# Patient Record
Sex: FEMALE | Race: WHITE | Employment: FULL TIME | URBAN - METROPOLITAN AREA
[De-identification: names, ages, dates, MRNs, and addresses within clinical notes are randomized per-mention and may not be internally consistent; named-entity substitution may affect disease eponyms.]

---

## 2022-06-30 ENCOUNTER — APPOINTMENT (OUTPATIENT)
Dept: GENERAL RADIOLOGY | Age: 47
End: 2022-06-30
Payer: COMMERCIAL

## 2022-06-30 ENCOUNTER — HOSPITAL ENCOUNTER (EMERGENCY)
Age: 47
Discharge: HOME | End: 2022-06-30
Attending: EMERGENCY MEDICINE
Payer: COMMERCIAL

## 2022-06-30 VITALS
WEIGHT: 214 LBS | OXYGEN SATURATION: 95 % | TEMPERATURE: 98.5 F | HEIGHT: 67 IN | SYSTOLIC BLOOD PRESSURE: 140 MMHG | BODY MASS INDEX: 33.59 KG/M2 | RESPIRATION RATE: 16 BRPM | DIASTOLIC BLOOD PRESSURE: 86 MMHG | HEART RATE: 74 BPM

## 2022-06-30 DIAGNOSIS — S82.832A OTHER CLOSED FRACTURE OF DISTAL END OF LEFT FIBULA, INITIAL ENCOUNTER: Primary | ICD-10-CM

## 2022-06-30 PROCEDURE — 73610 X-RAY EXAM OF ANKLE: CPT

## 2022-06-30 PROCEDURE — 99283 EMERGENCY DEPT VISIT LOW MDM: CPT

## 2022-06-30 PROCEDURE — 73630 X-RAY EXAM OF FOOT: CPT

## 2022-06-30 PROCEDURE — 6370000000 HC RX 637 (ALT 250 FOR IP): Performed by: STUDENT IN AN ORGANIZED HEALTH CARE EDUCATION/TRAINING PROGRAM

## 2022-06-30 PROCEDURE — 73590 X-RAY EXAM OF LOWER LEG: CPT

## 2022-06-30 RX ORDER — ACETAMINOPHEN 325 MG/1
650 TABLET ORAL ONCE
Status: COMPLETED | OUTPATIENT
Start: 2022-06-30 | End: 2022-06-30

## 2022-06-30 RX ORDER — IBUPROFEN 400 MG/1
800 TABLET ORAL ONCE
Status: COMPLETED | OUTPATIENT
Start: 2022-06-30 | End: 2022-06-30

## 2022-06-30 RX ADMIN — IBUPROFEN 800 MG: 400 TABLET, FILM COATED ORAL at 13:38

## 2022-06-30 RX ADMIN — ACETAMINOPHEN 650 MG: 325 TABLET ORAL at 13:38

## 2022-06-30 ASSESSMENT — ENCOUNTER SYMPTOMS
COUGH: 0
BACK PAIN: 0
DIARRHEA: 0
NAUSEA: 0
VOMITING: 0
SHORTNESS OF BREATH: 0
SORE THROAT: 0
CHEST TIGHTNESS: 0
ABDOMINAL PAIN: 0

## 2022-06-30 ASSESSMENT — PAIN DESCRIPTION - ORIENTATION
ORIENTATION: LEFT
ORIENTATION: LEFT

## 2022-06-30 ASSESSMENT — PAIN SCALES - GENERAL
PAINLEVEL_OUTOF10: 0
PAINLEVEL_OUTOF10: 4
PAINLEVEL_OUTOF10: 7

## 2022-06-30 ASSESSMENT — PAIN DESCRIPTION - PAIN TYPE: TYPE: ACUTE PAIN

## 2022-06-30 ASSESSMENT — PAIN DESCRIPTION - ONSET: ONSET: SUDDEN

## 2022-06-30 ASSESSMENT — PAIN DESCRIPTION - DESCRIPTORS: DESCRIPTORS: SHARP

## 2022-06-30 ASSESSMENT — PAIN DESCRIPTION - LOCATION
LOCATION: ANKLE
LOCATION: ANKLE

## 2022-06-30 ASSESSMENT — PAIN - FUNCTIONAL ASSESSMENT: PAIN_FUNCTIONAL_ASSESSMENT: 0-10

## 2022-06-30 NOTE — ED PROVIDER NOTES
ED Attending Attestation Note     Date of evaluation: 6/30/2022    This patient was seen by the resident. I have seen and examined the patient, agree with the workup, evaluation, management and diagnosis. The care plan has been discussed. My assessment reveals a 47-year-old female who presents with chief complaint of fall. Patient was on a hill and twisted her left ankle. Complaining of pain in her left ankle. No significant deformities at this time but patient is in a splint done by EMS. We will remove splint and reassess. Patient otherwise is well-appearing with no signs of trauma. Denise Pérez MD  06/30/22 [id number]

## 2022-06-30 NOTE — ED PROVIDER NOTES
[facility]          EM RESIDENT NOTE       Date of evaluation: 6/30/2022    Chief Complaint     Fall (slipped and fell on a slope left ankle pain)    History of Present Illness     Eduardo Pascual is a 50 y.o. female who presents with left ankle deformity. Patient was working in [address] [employer] outside of her house, when she rolled her left ankle. Reported immediate pain and then proceeded to have no sensation in the foot or ankle. Patient reports crawling into the house and up the stairs to grab her phone. EMS was called who evaluated in the field and found to have a deformity of her left ankle. Ankle was wrapped and patient was brought to the emergency department for further evaluation. Patient currently denies any pain to her left ankle but does report that it feels very sore. Endorses some soreness over her right knee but denies any other injuries or pain. Patient denies hitting her head or losing consciousness. Review of Systems     Review of Systems   Constitutional: Negative for chills, diaphoresis, fatigue and fever. HENT: Negative for sore throat. Eyes: Negative for visual disturbance. Respiratory: Negative for cough, chest tightness and shortness of breath. Cardiovascular: Negative for chest pain, palpitations and leg swelling. Gastrointestinal: Negative for abdominal pain, diarrhea, nausea and vomiting. Genitourinary: Negative for difficulty urinating. Musculoskeletal: Positive for joint swelling (L ankle). Negative for back pain and neck pain. Neurological: Negative for dizziness, syncope, weakness, light-headedness, numbness and headaches. All other systems reviewed and are negative. Past Medical, Surgical, Family, and Social History     She has no past medical history on file. She has a past surgical history that includes Cholecystectomy. Her family history is not on file. She reports that she has quit smoking.  She does not have any smokeless tobacco history on file. She reports current alcohol use. She reports current drug use. Drug: Other-see comments. Medications     Previous Medications    No medications on file     Allergies     She is allergic to codeine. Physical Exam     INITIAL VITALS: BP: (!) 145/86, Temp: 98.5 °F (36.9 °C), Heart Rate: 90, Resp: 17, SpO2: 97 %   Physical Exam  Vitals and nursing note reviewed. Constitutional:       General: She is not in acute distress. Appearance: Normal appearance. She is normal weight. She is not ill-appearing, toxic-appearing or diaphoretic. HENT:      Head: Normocephalic and atraumatic. Mouth/Throat:      Mouth: Mucous membranes are moist.      Pharynx: No posterior oropharyngeal erythema. Eyes:      Extraocular Movements: Extraocular movements intact. Conjunctiva/sclera: Conjunctivae normal.      Pupils: Pupils are equal, round, and reactive to light. Cardiovascular:      Rate and Rhythm: Normal rate and regular rhythm. Pulses: Normal pulses. Heart sounds: Normal heart sounds. Pulmonary:      Effort: Pulmonary effort is normal.      Breath sounds: Normal breath sounds. Abdominal:      General: There is no distension. Palpations: Abdomen is soft. Tenderness: There is no abdominal tenderness. Musculoskeletal:      Cervical back: Normal range of motion and neck supple. Right ankle: Normal.      Left ankle: Swelling and deformity present. Tenderness present over the medial malleolus. Decreased range of motion (inability to plantarflex, dorsiflexion). Left Achilles Tendon: Normal.      Comments:     Skin:     General: Skin is warm. Capillary Refill: Capillary refill takes less than 2 seconds. Neurological:      General: No focal deficit present. Mental Status: She is alert and oriented to person, place, and time. Mental status is at baseline.    Psychiatric:         Mood and Affect: Mood normal.         Behavior: Behavior normal. DiagnosticResults     RADIOLOGY:  XR FOOT LEFT (MIN 3 VIEWS)   Final Result      Mildly displaced distal fibular fracture with extension to the fibulotalar joint. No evidence of acute fracture in the foot. XR ANKLE LEFT (MIN 3 VIEWS)   Final Result      Mildly displaced distal fibular fracture with extension to the fibulotalar joint. No evidence of acute fracture in the foot. XR TIBIA FIBULA LEFT (2 VIEWS)   Final Result      Mildly displaced distal fibular fracture with extension to the fibulotalar joint. No evidence of acute fracture in the foot. LABS:   No results found for this visit on 06/30/22. RECENT VITALS:  BP: 126/86, Temp: 98.5 °F (36.9 °C), Heart Rate: 75,Resp: 18, SpO2: 95 %     Procedures     Splint Application    Date/Time: 6/30/2022 3:41 PM  Performed by: Black Oh MD  Authorized by: Deuce Kaiser MD     Consent:     Consent obtained:  Verbal    Consent given by:  Patient    Risks discussed:  Numbness and pain    Alternatives discussed:  No treatment  Pre-procedure details:     Sensation:  Normal  Procedure details:     Laterality:  Left    Location:  Ankle    Ankle:  L ankle    Strapping: no      Splint type:  Long leg and sugar tong    Supplies:  Ortho-Glass and cotton padding  Post-procedure details:     Pain:  Improved    Sensation:  Normal    Patient tolerance of procedure: Tolerated well, no immediate complications      ED Course     Nursing Notes, Past Medical Hx, Past Surgical Hx, Social Hx, Allergies, and Family Hx were reviewed. The patient was given the followingmedications:  Orders Placed This Encounter   Medications    ibuprofen (ADVIL;MOTRIN) tablet 800 mg    acetaminophen (TYLENOL) tablet 650 mg       CONSULTS:  [address] [place] [address] / [place] / Chilo Argentina is a 50 y.o. female who presented with left ankle deformity status post fall.  Physical exam is concerning for obvious deformity and swelling to left medial ankle with inability to dorsiflex. X-ray imaging showed acute fracture of the distal fibular metaphysis with approximately 5 mm posterior displacement of the distal fracture fragment without any evidence of dislocation. Orthopedics was consulted who agreed that a splint would be sufficient along with outpatient orthopedic follow-up. Patient was placed in a posterior long-leg and sugar-tong splint. Patient tolerated the procedure well. Patient is neurovascularly intact distally post splint. Patient given instructions to follow-up with orthopedics within the week. Patient given information for orthopedic clinic. This patient was also evaluated by the attending physician. All care plans were discussed and agreed upon. Clinical Impression     1.  Other closed fracture of distal end of left fibula, initial encounter      Disposition     PATIENT REFERRED TO:  Paris Zhang MD  [facility] [address] [facility] [address] [facility]  [phone]    Schedule an appointment as soon as possible for a visit in 1 day  Regarding today's visit      DISCHARGE MEDICATIONS:  New Prescriptions    No medications on file       DISPOSITION Decision To Discharge 06/30/2022 03:27:30 Shelly Bustamante MD  Resident  06/30/22 4238 Vital Signs Last 24 Hrs  T(C): 36.8 (18 Mar 2020 17:24), Max: 36.8 (18 Mar 2020 17:24)  T(F): 98.3 (18 Mar 2020 17:24), Max: 98.3 (18 Mar 2020 17:24)  HR: 70 (18 Mar 2020 21:55) (70 - 90)  BP: 119/79 (18 Mar 2020 21:55) (119/79 - 132/83)  BP(mean): --  RR: 18 (18 Mar 2020 21:55) (16 - 18)  SpO2: 100% (18 Mar 2020 21:55) (100% - 100%)      LABS:                        14.9   10.62 )-----------( 242      ( 18 Mar 2020 19:40 )             45.2         139  |  100  |  8   ----------------------------<  96  4.2   |  24  |  0.92    Ca    9.6      18 Mar 2020 19:18    TPro  8.4<H>  /  Alb  4.7  /  TBili  0.6  /  DBili  x   /  AST  24  /  ALT  23  /  AlkPhos  67  -18    PT/INR - ( 18 Mar 2020 19:40 )   PT: 11.0 SEC;   INR: 0.97          PTT - ( 18 Mar 2020 19:40 )  PTT:32.0 SEC  Urinalysis Basic - ( 18 Mar 2020 20:20 )    Color: YELLOW / Appearance: CLEAR / S.024 / pH: 6.0  Gluc: NEGATIVE / Ketone: NEGATIVE  / Bili: NEGATIVE / Urobili: NORMAL   Blood: MODERATE / Protein: 30 / Nitrite: NEGATIVE   Leuk Esterase: NEGATIVE / RBC: 6-10 / WBC 0-2   Sq Epi: x / Non Sq Epi: x / Bacteria: OCC.        INs and OUTs:        < from: CT Abdomen and Pelvis w/ IV Cont (20 @ 20:55) >      IMPRESSION:     Acute appendicitis no abscess.    A 1.3 cm hyperdensity in the right lobe of the liver that is statistically likely a benign lesion such as an atypical hemangioma or FNH. Recommend nonemergent MRI of the abdomen with intravenous contrast.    < end of copied text >